# Patient Record
Sex: FEMALE | Race: WHITE | NOT HISPANIC OR LATINO | Employment: FULL TIME | URBAN - METROPOLITAN AREA
[De-identification: names, ages, dates, MRNs, and addresses within clinical notes are randomized per-mention and may not be internally consistent; named-entity substitution may affect disease eponyms.]

---

## 2019-06-03 ENCOUNTER — HISTORICAL (OUTPATIENT)
Dept: ADMINISTRATIVE | Facility: HOSPITAL | Age: 34
End: 2019-06-03

## 2019-06-03 LAB
CANCER AG125 SERPL-ACNC: 8.6 IU/ML (ref 1.5–35)
FT4I SERPL CALC-MCNC: 3.89 (ref 2.6–3.6)
T3RU NFR SERPL: 36 % (ref 31–39)
T4 FREE SERPL-MCNC: 1.03 NG/DL (ref 0.76–1.46)
T4 SERPL-MCNC: 10.8 MCG/DL (ref 4.7–13.3)
TSH SERPL-ACNC: 0.72 MIU/L (ref 0.36–3.74)

## 2019-09-12 ENCOUNTER — HISTORICAL (OUTPATIENT)
Dept: ADMINISTRATIVE | Facility: HOSPITAL | Age: 34
End: 2019-09-12

## 2019-09-12 LAB — CANCER AG125 SERPL-ACNC: 8.2 IU/ML (ref 1.5–35)

## 2020-01-27 LAB
B-HCG SERPL QL: NEGATIVE
ERYTHROCYTE [DISTWIDTH] IN BLOOD BY AUTOMATED COUNT: 12.7 % (ref 11.5–17)
HCT VFR BLD AUTO: 41.9 % (ref 37–47)
HGB BLD-MCNC: 13.2 GM/DL (ref 12–16)
MCH RBC QN AUTO: 30.2 PG (ref 27–31)
MCHC RBC AUTO-ENTMCNC: 31.5 GM/DL (ref 33–36)
MCV RBC AUTO: 95.9 FL (ref 80–94)
PLATELET # BLD AUTO: 270 X10(3)/MCL (ref 130–400)
PMV BLD AUTO: 11 FL (ref 9.4–12.4)
RBC # BLD AUTO: 4.37 X10(6)/MCL (ref 4.2–5.4)
WBC # SPEC AUTO: 7.4 X10(3)/MCL (ref 4.5–11.5)

## 2020-01-28 ENCOUNTER — HISTORICAL (OUTPATIENT)
Dept: ADMINISTRATIVE | Facility: HOSPITAL | Age: 35
End: 2020-01-28

## 2020-01-28 LAB — POC BETA-HCG (QUAL): NEGATIVE

## 2022-04-10 ENCOUNTER — HISTORICAL (OUTPATIENT)
Dept: ADMINISTRATIVE | Facility: HOSPITAL | Age: 37
End: 2022-04-10

## 2022-04-25 VITALS
SYSTOLIC BLOOD PRESSURE: 119 MMHG | OXYGEN SATURATION: 99 % | BODY MASS INDEX: 21.22 KG/M2 | HEIGHT: 68 IN | DIASTOLIC BLOOD PRESSURE: 83 MMHG | WEIGHT: 140 LBS

## 2022-04-30 NOTE — OP NOTE
Patient:   Karmen Gutierrez            MRN: [id number]            FIN: [id number]               Age:   34 years     Sex:  Female     :  1985   Associated Diagnoses:   Cyst of ovary, left   Author:   Christiane Cotter MD      Operative Note   Operative Information   Procedures Performed: Laparoscopic left salpino-oophorectomy, removal of scalp cyst.     Preoperative Diagnosis: Cyst of ovary, left ([id number] N83.202), scalp cyst.     Postoperative Diagnosis: same as pre-op.     Surgeon: Christiane Cotter MD.     Assistant: Chely Flores.     Anesthesia: General.     Speciman Removed: Left fallopian tube and ovary, scalp cyst.     Description of Procedure/Findings/    Complications: After operative consent had been obtained the pt was taken in to the operating room and placed in a supine position, she underwent smooth induction of general endotracheal anesthesia, she was then placed in a dorsal lithotomy position, prepped and draped in a sterile manner and a pickett catheter was placed in to the bladder using sterile technique with return of clear urine, examination under anesthesia showed normal sized uterus with left adnexal mass, a speculum was placed in to the vagina, single tooth tenaculum grasped the anterior lip of the cervix, an acorn manipulator was placed into the cervix and the speculum was removed; at this time a one cm skin incision was made with the scalpel above the umbilicus through previous scar, , the Verres needle was placed atraumatically in to the peritoneal cavity and the abdomen was insufflated with 2 1/2 liters of carbon dioxide, the primary trocar was placed and the trocar removed and the laparoscope was  placed in to the prirmary sleeve; two secondary skin incisions were then made with the scalpel and the trocars placed atraumatically the the trocars removed, a grasper was placed in to one sleeve and the Harmonic scalpel was placed in to the other sleeve; at this time the  left ovary was noted to be grossly enlarged with a normal appearing fallopian tube, , right ovary was noted to have several small cysts and tube were noted to be normal, the Harmonic scalpel was then used on the utero-ovarian ligament and along the inferior edge of the ovary until it was entirely dissected, the ovary was then removed from one of the secondary skin incisions in an endopouch after the ovary had been punctured and the liquid removed, , the pelvis was then irrigated, the appendix was identified and noted to be normal, the gallbladder was normal as was the liver edge,  hemostasis was noted to be good, all instrumentation was removed from abdomen, the abdomen was emptyed of carbon dioxide,  skin incisions were closed with 4-0 dexon in subcuticular stitches, steri-strips  were  applied over incision sites; the single tooth tenaculum and acorn manipulator were removed, Monsel's was placed on the puncture sites,  hemostasis was noted to be good, the pt was taken down from the dorsal lithotomy position, replaced in to the supine position; at this time scalp cyst was identified, area was prepped and cleansed, a scalpel was used the the cyst isolated and removed in it's entirety, the incision was closed with 2-0 silk times three, hemostasis was noted to be good, neosporin was applied to incision site,  she was awakened and extubated in the operating room and taken to the recovery room in stable condition, after needle and sponge counts were correct times two...     Esimated blood loss: loss  20  cc.     Findings: benign appearing left ovarian cyst, scalp cyst.     Complications: None.

## 2022-04-30 NOTE — DISCHARGE SUMMARY
Patient:   Karmen Gutierrez            MRN: [id number]            FIN: [id number]               Age:   34 years     Sex:  Female     :  1985   Associated Diagnoses:   None   Author:   Vahe CORONA, Christiane LEZAMA      Discharge Information   Discharge Summary Information     Admitted  2020.     Discharged  2020.        Hospital Course   Pt is a 34 y.o. WF admitted for outpatient laparoscopic removal of persistant left ovarian mass, it was noted to be probable  dermoid, it was removed, pt had also requested removal of scalp cyst, it was also removed without difficulty, pt tolerated  the procedures well.      Discharge Plan   Discharge Summary Plan   Discharge Status: stable.     Discharge Disposition: discharge to home.     Prescriptions: written and given to patient.

## 2024-02-12 DIAGNOSIS — G43.909 MIGRAINES: Primary | ICD-10-CM

## 2024-03-26 ENCOUNTER — OFFICE VISIT (OUTPATIENT)
Dept: NEUROLOGY | Facility: CLINIC | Age: 39
End: 2024-03-26
Payer: COMMERCIAL

## 2024-03-26 VITALS
HEIGHT: 68 IN | DIASTOLIC BLOOD PRESSURE: 80 MMHG | SYSTOLIC BLOOD PRESSURE: 120 MMHG | WEIGHT: 147 LBS | BODY MASS INDEX: 22.28 KG/M2 | HEART RATE: 72 BPM

## 2024-03-26 DIAGNOSIS — G43.909 MIGRAINES: ICD-10-CM

## 2024-03-26 DIAGNOSIS — G44.82 PRIMARY HEADACHE ASSOCIATED WITH SEXUAL ACTIVITY: Primary | ICD-10-CM

## 2024-03-26 DIAGNOSIS — G44.82 HEADACHE ASSOCIATED WITH SEXUAL ACTIVITY: ICD-10-CM

## 2024-03-26 DIAGNOSIS — G08 DURAL VENOUS SINUS THROMBOSIS: ICD-10-CM

## 2024-03-26 PROCEDURE — 99999 PR PBB SHADOW E&M-EST. PATIENT-LVL III: CPT | Mod: PBBFAC,,, | Performed by: NURSE PRACTITIONER

## 2024-03-26 PROCEDURE — 3074F SYST BP LT 130 MM HG: CPT | Mod: CPTII,S$GLB,, | Performed by: NURSE PRACTITIONER

## 2024-03-26 PROCEDURE — 1159F MED LIST DOCD IN RCRD: CPT | Mod: CPTII,S$GLB,, | Performed by: NURSE PRACTITIONER

## 2024-03-26 PROCEDURE — 1160F RVW MEDS BY RX/DR IN RCRD: CPT | Mod: CPTII,S$GLB,, | Performed by: NURSE PRACTITIONER

## 2024-03-26 PROCEDURE — 3008F BODY MASS INDEX DOCD: CPT | Mod: CPTII,S$GLB,, | Performed by: NURSE PRACTITIONER

## 2024-03-26 PROCEDURE — 3079F DIAST BP 80-89 MM HG: CPT | Mod: CPTII,S$GLB,, | Performed by: NURSE PRACTITIONER

## 2024-03-26 PROCEDURE — 99204 OFFICE O/P NEW MOD 45 MIN: CPT | Mod: S$GLB,,, | Performed by: NURSE PRACTITIONER

## 2024-03-26 RX ORDER — NORETHINDRONE ACETATE AND ETHINYL ESTRADIOL AND FERROUS FUMARATE 1MG-20(21)
1 KIT ORAL DAILY
COMMUNITY
Start: 2024-03-04

## 2024-03-26 NOTE — ASSESSMENT & PLAN NOTE
-MRI, MRA, MRV to r/o structural or vascular cause or CVST.  Follow up with Dr. Kirkpatrick.  If strong suspicion for RCVS could consider referral for diagnostic angio, but pain ONLY occurs with orgasm so hold off on that for now  -May try pre-medicating with indomethacin 30 minutes prior to intercourse once work up complete

## 2024-03-26 NOTE — PROGRESS NOTES
Subjective:       Patient ID: Karmen Gutierrez is a 38 y.o. female.    Chief Complaint: Migraine (NP: Referred by Dr. Freda Kohli for neuro consult to evaluate for migraine: Less than a year ago started having migraines, that are only during sexual intercourse. States pain is very intense to head, lasting for 10 - 30 minutes. )      History of Present Illness:  Patient referred by Dr. Kohli to evaluate for headaches.  38-year-old woman who began having head pain roughly 8-9 months ago.  The head pain only occurs just prior to orgasm during sex.  She has abrupt onset of severe head pain that feels like her entire head is going to explode.  It will last for 10-20 minutes and then resolve.  It is occurred 6-9 times over the last 8-9 months and does not occur with every sexual climax.  She denies any vision change or diplopia, denies any facial numbness or tingling, denies any focal weakness.  Prior to this, she never really got headaches and denies any history of migraines.  No family history of aneurysm a clotting disorder.  She is on combination OCPs, but says she has been on this particular birth control for quite some time.        Review of Systems  I have reviewed a 12 point review of systems which is scanned into the chart        No past medical history on file.    Past Surgical History:   Procedure Laterality Date    Breast augumentation      ROBOT-ASSISTED LAPAROSCOPIC SURGICAL REMOVAL OF OVARY USING DA DEB XI          Family History   Problem Relation Age of Onset    Hypertension Mother     Colon polyps Father     Hypertension Father     Colon polyps Sister     Epilepsy Brother         Social History     Socioeconomic History    Marital status: Single   Tobacco Use    Smoking status: Never    Smokeless tobacco: Never   Substance and Sexual Activity    Alcohol use: Yes     Comment: Twice monthly    Drug use: Never        Outpatient Encounter Medications as of 3/26/2024   Medication Sig Dispense  "Refill    JUNEL FE 1/20, 28, 1 mg-20 mcg (21)/75 mg (7) per tablet Take 1 tablet by mouth once daily.      UNABLE TO FIND Take 1 capsule by mouth once daily. NMN       No facility-administered encounter medications on file as of 3/26/2024.      Objective:   /80 (BP Location: Right arm)   Pulse 72   Ht 5' 8" (1.727 m)   Wt 66.7 kg (147 lb)   BMI 22.35 kg/m²        Physical Exam  General:  Alert and oriented  NAD  No overt edema    Cognition and Comprehension:  Speech and language intact  Follows commands    Cranial nerves:   CN 2_ Visual fields (full to confrontation both eyes)  CN 3, 4, 6_ Intact, BABATUNDE, no nystagmus  CN 5_facial tactile sensation intact  CN 7_no face asymmetry; normal eye closure and smile  CN 8_hearing intact to spoken voice  CN 9, 10, 11_voice normal, shoulder shrug ok; deltoids not fatigable   CN 12 tongue_protrudes mid line    Muscle Strength and Tone:  Normal upper extremity tone  Normal lower extremity tone  Normal upper extremity strength  Normal lower extremity strength    Reflexes:  Normal and symmetric    Sensation:  Intact to light touch and temperature    Coordination and Gait:  Coordination and gait are normal   No ataxia with FTN or HKS      Assessment & Plan:      1. Primary headache associated with sexual activity  Assessment & Plan:  -MRI, MRA, MRV to r/o structural or vascular cause or CVST.  Follow up with Dr. Kirkpatrick.  If strong suspicion for RCVS could consider referral for diagnostic angio, but pain ONLY occurs with orgasm so hold off on that for now  -May try pre-medicating with indomethacin 30 minutes prior to intercourse once work up complete      2. Migraines  -     Ambulatory referral/consult to Neurology          This note was created with the assistance of voice recognition software. There may be transcription errors as a result of using this technology however minimal. Effort has been made to assure accuracy of transcription but any obvious errors or " omissions should be clarified with the author of the document.

## 2024-04-09 ENCOUNTER — TELEPHONE (OUTPATIENT)
Dept: NEUROLOGY | Facility: CLINIC | Age: 39
End: 2024-04-09
Payer: COMMERCIAL

## 2024-04-09 RX ORDER — DIAZEPAM 5 MG/1
5 TABLET ORAL ONCE AS NEEDED
Qty: 2 TABLET | Refills: 0 | Status: SHIPPED | OUTPATIENT
Start: 2024-04-09 | End: 2024-04-09

## 2024-04-12 ENCOUNTER — TELEPHONE (OUTPATIENT)
Dept: NEUROLOGY | Facility: CLINIC | Age: 39
End: 2024-04-12
Payer: COMMERCIAL

## 2024-04-12 DIAGNOSIS — G44.82 PRIMARY HEADACHE ASSOCIATED WITH SEXUAL ACTIVITY: Primary | ICD-10-CM

## 2024-04-12 NOTE — TELEPHONE ENCOUNTER
Patient calling regarding results of her MRI that she saw resulted in her chart this morning, paul guerrier CB

## 2024-04-12 NOTE — TELEPHONE ENCOUNTER
----- Message from POONAM Farrell sent at 4/12/2024  9:28 AM CDT -----  MRA and MRV were normal.  MRI brain was also overall normal.  There were 2 tiny white spots that can be seen with headaches, but this is nothing to worry about and does not cause problems.  I will go ahead and send in the medication we talked about at her visit that she can try pre-medicating with roughly 30 minutes before intercourse.  Also, the only other thing diagnostically that I would recommend she do would be to get a dilated eye exam with an opthalmologist (if she has not had one recently) just to check pressure behind her eyes since imaging does not indicate pressure levels in the brain.  This is something that can be done at her convenience.  Not emergent at all but is something that would be good to check off the list

## 2024-04-12 NOTE — TELEPHONE ENCOUNTER
Pt informed MRA and MRV normal. MRI brain was overall normal there were two tiny white spots that can be seen in Pt's with HA's but wouldn't cause any problems/symptoms. Rx will be sent that was discussed at last OV that can be taken 30 minutes before intercourse. The only other diagnostic testing that could possible be done would be a dilated eye exam with and ophthalmologist to check for the pressure behind the eyes since imaging us able to do so. It is not emergent and can be done at her soonest availability

## 2024-04-15 RX ORDER — INDOMETHACIN 50 MG/1
50 CAPSULE ORAL DAILY PRN
Qty: 20 CAPSULE | Refills: 5 | Status: SHIPPED | OUTPATIENT
Start: 2024-04-15

## 2024-07-22 ENCOUNTER — TELEPHONE (OUTPATIENT)
Dept: NEUROLOGY | Facility: CLINIC | Age: 39
End: 2024-07-22
Payer: COMMERCIAL

## 2024-07-22 NOTE — TELEPHONE ENCOUNTER
----- Message from Maribell Strickland LPN sent at 2024  2:27 PM CDT -----  Regarding: CLINICAL MESSAGE  24 11:35a TAKEN     To:          Office  From:        Karmen Gutierrez  Phone:      [phone]  Patient:     Same  :        [birth year]  RegDr:      Dr Lolita Kirkpatrick  Ref:        returning a call     Subject:         Patient Calls  ClrID:   [phone]